# Patient Record
Sex: FEMALE | Race: OTHER | NOT HISPANIC OR LATINO | URBAN - METROPOLITAN AREA
[De-identification: names, ages, dates, MRNs, and addresses within clinical notes are randomized per-mention and may not be internally consistent; named-entity substitution may affect disease eponyms.]

---

## 2017-12-27 ENCOUNTER — HISTORICAL (OUTPATIENT)
Dept: RADIOLOGY | Facility: HOSPITAL | Age: 57
End: 2017-12-27

## 2018-05-14 ENCOUNTER — HISTORICAL (OUTPATIENT)
Dept: ADMINISTRATIVE | Facility: HOSPITAL | Age: 58
End: 2018-05-14

## 2018-05-14 LAB
ALBUMIN SERPL-MCNC: 4.4 GM/DL (ref 3.4–5)
ALBUMIN/GLOB SERPL: 1.76 {RATIO} (ref 1.5–2.5)
ALP SERPL-CCNC: 69 UNIT/L (ref 38–126)
ALT SERPL-CCNC: 18 UNIT/L (ref 7–52)
AST SERPL-CCNC: 19 UNIT/L (ref 15–37)
BILIRUB SERPL-MCNC: 0.6 MG/DL (ref 0.2–1)
BILIRUBIN DIRECT+TOT PNL SERPL-MCNC: 0.1 MG/DL (ref 0–0.5)
BILIRUBIN DIRECT+TOT PNL SERPL-MCNC: 0.5 MG/DL
BUN SERPL-MCNC: 9 MG/DL (ref 7–18)
CALCIUM SERPL-MCNC: 9.6 MG/DL (ref 8.5–10)
CHLORIDE SERPL-SCNC: 103 MMOL/L (ref 98–107)
CHOLEST SERPL-MCNC: 142 MG/DL (ref 0–200)
CHOLEST/HDLC SERPL: 4.3 {RATIO}
CO2 SERPL-SCNC: 30 MMOL/L (ref 21–32)
CREAT SERPL-MCNC: 0.71 MG/DL (ref 0.6–1.3)
GLOBULIN SER-MCNC: 2.5 GM/DL (ref 1.2–3)
GLUCOSE SERPL-MCNC: 83 MG/DL (ref 74–106)
HDLC SERPL-MCNC: 33 MG/DL (ref 35–60)
LDLC SERPL CALC-MCNC: 89 MG/DL (ref 0–129)
POTASSIUM SERPL-SCNC: 4.3 MMOL/L (ref 3.5–5.1)
PROT SERPL-MCNC: 6.9 GM/DL (ref 6.4–8.2)
SODIUM SERPL-SCNC: 138 MMOL/L (ref 136–145)
TRIGL SERPL-MCNC: 83 MG/DL (ref 30–150)
VLDLC SERPL CALC-MCNC: 16.6 MG/DL

## 2018-11-15 ENCOUNTER — HISTORICAL (OUTPATIENT)
Dept: ADMINISTRATIVE | Facility: HOSPITAL | Age: 58
End: 2018-11-15

## 2018-11-15 LAB
ABS NEUT (OLG): 4.5 X10(3)/MCL (ref 2.1–9.2)
ALBUMIN SERPL-MCNC: 4.3 GM/DL (ref 3.4–5)
ALBUMIN/GLOB SERPL: 1.43 {RATIO} (ref 1.5–2.5)
ALP SERPL-CCNC: 64 UNIT/L (ref 38–126)
ALT SERPL-CCNC: 15 UNIT/L (ref 7–52)
AST SERPL-CCNC: 15 UNIT/L (ref 15–37)
BILIRUB SERPL-MCNC: 0.6 MG/DL (ref 0.2–1)
BILIRUBIN DIRECT+TOT PNL SERPL-MCNC: 0.2 MG/DL (ref 0–0.5)
BILIRUBIN DIRECT+TOT PNL SERPL-MCNC: 0.4 MG/DL
BUN SERPL-MCNC: 15 MG/DL (ref 7–18)
CALCIUM SERPL-MCNC: 9.4 MG/DL (ref 8.5–10)
CHLORIDE SERPL-SCNC: 101 MMOL/L (ref 98–107)
CHOLEST SERPL-MCNC: 145 MG/DL (ref 0–200)
CHOLEST/HDLC SERPL: 3.5 {RATIO}
CO2 SERPL-SCNC: 30 MMOL/L (ref 21–32)
CREAT SERPL-MCNC: 0.78 MG/DL (ref 0.6–1.3)
ERYTHROCYTE [DISTWIDTH] IN BLOOD BY AUTOMATED COUNT: 13.5 % (ref 11.5–17)
GLOBULIN SER-MCNC: 3 GM/DL (ref 1.2–3)
GLUCOSE SERPL-MCNC: 100 MG/DL (ref 74–106)
HCT VFR BLD AUTO: 41.9 % (ref 37–47)
HDLC SERPL-MCNC: 41 MG/DL (ref 35–60)
HGB BLD-MCNC: 13.2 GM/DL (ref 12–16)
LDLC SERPL CALC-MCNC: 95 MG/DL (ref 0–129)
LYMPHOCYTES # BLD AUTO: 2.2 X10(3)/MCL (ref 0.6–3.4)
LYMPHOCYTES NFR BLD AUTO: 29.1 % (ref 13–40)
MCH RBC QN AUTO: 28.3 PG (ref 27–31.2)
MCHC RBC AUTO-ENTMCNC: 32 GM/DL (ref 32–36)
MCV RBC AUTO: 90 FL (ref 80–94)
MONOCYTES # BLD AUTO: 0.7 X10(3)/MCL (ref 0.1–1.3)
MONOCYTES NFR BLD AUTO: 9.2 % (ref 0.1–24)
NEUTROPHILS NFR BLD AUTO: 61.7 % (ref 47–80)
PLATELET # BLD AUTO: 301 X10(3)/MCL (ref 130–400)
PMV BLD AUTO: 9.3 FL (ref 9.4–12.4)
POTASSIUM SERPL-SCNC: 4.4 MMOL/L (ref 3.5–5.1)
PROT SERPL-MCNC: 7.3 GM/DL (ref 6.4–8.2)
RBC # BLD AUTO: 4.67 X10(6)/MCL (ref 4.2–5.4)
SODIUM SERPL-SCNC: 135 MMOL/L (ref 136–145)
TRIGL SERPL-MCNC: 72 MG/DL (ref 30–150)
TSH SERPL-ACNC: 2.35 MIU/ML (ref 0.35–4.94)
VLDLC SERPL CALC-MCNC: 14.4 MG/DL
WBC # SPEC AUTO: 7.4 X10(3)/MCL (ref 4.5–11.5)

## 2018-12-28 ENCOUNTER — HISTORICAL (OUTPATIENT)
Dept: RADIOLOGY | Facility: HOSPITAL | Age: 58
End: 2018-12-28

## 2019-05-15 ENCOUNTER — HISTORICAL (OUTPATIENT)
Dept: ADMINISTRATIVE | Facility: HOSPITAL | Age: 59
End: 2019-05-15

## 2019-05-15 LAB
ALBUMIN SERPL-MCNC: 4.2 GM/DL (ref 3.4–5)
ALBUMIN/GLOB SERPL: 1.5 {RATIO} (ref 1.5–2.5)
ALP SERPL-CCNC: 69 UNIT/L (ref 38–126)
ALT SERPL-CCNC: 20 UNIT/L (ref 7–52)
AST SERPL-CCNC: 17 UNIT/L (ref 15–37)
BILIRUB SERPL-MCNC: 0.5 MG/DL (ref 0.2–1)
BILIRUBIN DIRECT+TOT PNL SERPL-MCNC: 0.1 MG/DL (ref 0–0.5)
BILIRUBIN DIRECT+TOT PNL SERPL-MCNC: 0.4 MG/DL
BUN SERPL-MCNC: 14 MG/DL (ref 7–18)
CALCIUM SERPL-MCNC: 9.2 MG/DL (ref 8.5–10)
CHLORIDE SERPL-SCNC: 105 MMOL/L (ref 98–107)
CHOLEST SERPL-MCNC: 149 MG/DL (ref 0–200)
CHOLEST/HDLC SERPL: 3.7 {RATIO}
CO2 SERPL-SCNC: 28 MMOL/L (ref 21–32)
CREAT SERPL-MCNC: 0.64 MG/DL (ref 0.6–1.3)
GLOBULIN SER-MCNC: 2.8 GM/DL (ref 1.2–3)
GLUCOSE SERPL-MCNC: 92 MG/DL (ref 74–106)
HDLC SERPL-MCNC: 40 MG/DL (ref 35–60)
LDLC SERPL CALC-MCNC: 103 MG/DL (ref 0–129)
POTASSIUM SERPL-SCNC: 4.4 MMOL/L (ref 3.5–5.1)
PROT SERPL-MCNC: 7 GM/DL (ref 6.4–8.2)
SODIUM SERPL-SCNC: 136 MMOL/L (ref 136–145)
TRIGL SERPL-MCNC: 51 MG/DL (ref 30–150)
VLDLC SERPL CALC-MCNC: 10.2 MG/DL

## 2019-11-19 ENCOUNTER — HISTORICAL (OUTPATIENT)
Dept: ADMINISTRATIVE | Facility: HOSPITAL | Age: 59
End: 2019-11-19

## 2019-11-19 LAB
ABS NEUT (OLG): 4.7 X10(3)/MCL (ref 2.1–9.2)
ALBUMIN SERPL-MCNC: 4.4 GM/DL (ref 3.4–5)
ALBUMIN/GLOB SERPL: 1.57 {RATIO} (ref 1.5–2.5)
ALP SERPL-CCNC: 61 UNIT/L (ref 38–126)
ALT SERPL-CCNC: 17 UNIT/L (ref 7–52)
APPEARANCE, UA: CLEAR
AST SERPL-CCNC: 16 UNIT/L (ref 15–37)
BACTERIA #/AREA URNS AUTO: ABNORMAL /HPF
BILIRUB SERPL-MCNC: 0.7 MG/DL (ref 0.2–1)
BILIRUB UR QL STRIP: NEGATIVE MG/DL
BILIRUBIN DIRECT+TOT PNL SERPL-MCNC: 0.2 MG/DL (ref 0–0.5)
BILIRUBIN DIRECT+TOT PNL SERPL-MCNC: 0.5 MG/DL
BUN SERPL-MCNC: 13 MG/DL (ref 7–18)
CALCIUM SERPL-MCNC: 9.7 MG/DL (ref 8.5–10)
CHLORIDE SERPL-SCNC: 102 MMOL/L (ref 98–107)
CHOLEST SERPL-MCNC: 157 MG/DL (ref 0–200)
CHOLEST/HDLC SERPL: 4.2 {RATIO}
CO2 SERPL-SCNC: 30 MMOL/L (ref 21–32)
COLOR UR: YELLOW
CREAT SERPL-MCNC: 0.77 MG/DL (ref 0.6–1.3)
ERYTHROCYTE [DISTWIDTH] IN BLOOD BY AUTOMATED COUNT: 13.4 % (ref 11.5–17)
GLOBULIN SER-MCNC: 2.8 GM/DL (ref 1.2–3)
GLUCOSE (UA): NEGATIVE MG/DL
GLUCOSE SERPL-MCNC: 101 MG/DL (ref 74–106)
HCT VFR BLD AUTO: 39.8 % (ref 37–47)
HDLC SERPL-MCNC: 37 MG/DL (ref 35–60)
HGB BLD-MCNC: 13.1 GM/DL (ref 12–16)
HGB UR QL STRIP: ABNORMAL UNIT/L
KETONES UR QL STRIP: NEGATIVE MG/DL
LDLC SERPL CALC-MCNC: 109 MG/DL (ref 0–129)
LEUKOCYTE ESTERASE UR QL STRIP: NEGATIVE UNIT/L
LYMPHOCYTES # BLD AUTO: 2.5 X10(3)/MCL (ref 0.6–3.4)
LYMPHOCYTES NFR BLD AUTO: 32.6 % (ref 13–40)
MCH RBC QN AUTO: 27.6 PG (ref 27–31.2)
MCHC RBC AUTO-ENTMCNC: 33 GM/DL (ref 32–36)
MCV RBC AUTO: 84 FL (ref 80–94)
MONOCYTES # BLD AUTO: 0.5 X10(3)/MCL (ref 0.1–1.3)
MONOCYTES NFR BLD AUTO: 6.8 % (ref 0.1–24)
NEUTROPHILS NFR BLD AUTO: 60.6 % (ref 47–80)
NITRITE UR QL STRIP.AUTO: NEGATIVE
PH UR STRIP: 5 [PH]
PLATELET # BLD AUTO: 301 X10(3)/MCL (ref 130–400)
PMV BLD AUTO: 9.4 FL (ref 9.4–12.4)
POTASSIUM SERPL-SCNC: 4.1 MMOL/L (ref 3.5–5.1)
PROT SERPL-MCNC: 7.2 GM/DL (ref 6.4–8.2)
PROT UR QL STRIP: NEGATIVE MG/DL
RBC # BLD AUTO: 4.74 X10(6)/MCL (ref 4.2–5.4)
RBC #/AREA URNS HPF: ABNORMAL /HPF
SODIUM SERPL-SCNC: 137 MMOL/L (ref 136–145)
SP GR UR STRIP: 1.02
SQUAMOUS EPITHELIAL, UA: ABNORMAL /LPF
TRIGL SERPL-MCNC: 80 MG/DL (ref 30–150)
TSH SERPL-ACNC: 0.79 MIU/ML (ref 0.35–4.94)
UROBILINOGEN UR STRIP-ACNC: 0.2 MG/DL
VLDLC SERPL CALC-MCNC: 16 MG/DL
WBC # SPEC AUTO: 7.7 X10(3)/MCL (ref 4.5–11.5)
WBC #/AREA URNS AUTO: ABNORMAL /[HPF]

## 2020-08-20 ENCOUNTER — HISTORICAL (OUTPATIENT)
Dept: RADIOLOGY | Facility: HOSPITAL | Age: 60
End: 2020-08-20

## 2020-12-15 ENCOUNTER — HISTORICAL (OUTPATIENT)
Dept: ADMINISTRATIVE | Facility: HOSPITAL | Age: 60
End: 2020-12-15

## 2020-12-15 LAB
ABS NEUT (OLG): 5.2 X10(3)/MCL (ref 2.1–9.2)
ALBUMIN SERPL-MCNC: 4.5 GM/DL (ref 3.4–5)
ALBUMIN/GLOB SERPL: 1.67 {RATIO} (ref 1.5–2.5)
ALP SERPL-CCNC: 60 UNIT/L (ref 38–126)
ALT SERPL-CCNC: 21 UNIT/L (ref 7–52)
AST SERPL-CCNC: 20 UNIT/L (ref 15–37)
BILIRUB SERPL-MCNC: 0.6 MG/DL (ref 0.2–1)
BILIRUBIN DIRECT+TOT PNL SERPL-MCNC: 0.1 MG/DL (ref 0–0.5)
BILIRUBIN DIRECT+TOT PNL SERPL-MCNC: 0.5 MG/DL
BUN SERPL-MCNC: 13 MG/DL (ref 7–18)
CALCIUM SERPL-MCNC: 9.9 MG/DL (ref 8.5–10)
CHLORIDE SERPL-SCNC: 101 MMOL/L (ref 98–107)
CHOLEST SERPL-MCNC: 149 MG/DL (ref 0–200)
CHOLEST/HDLC SERPL: 3.7 {RATIO}
CO2 SERPL-SCNC: 31 MMOL/L (ref 21–32)
CREAT SERPL-MCNC: 0.81 MG/DL (ref 0.6–1.3)
ERYTHROCYTE [DISTWIDTH] IN BLOOD BY AUTOMATED COUNT: 13.4 % (ref 11.5–17)
GLOBULIN SER-MCNC: 2.7 GM/DL (ref 1.2–3)
GLUCOSE SERPL-MCNC: 82 MG/DL (ref 74–106)
HCT VFR BLD AUTO: 40.6 % (ref 37–47)
HDLC SERPL-MCNC: 40 MG/DL (ref 35–60)
HGB BLD-MCNC: 13.1 GM/DL (ref 12–16)
LDLC SERPL CALC-MCNC: 101 MG/DL (ref 0–129)
LYMPHOCYTES # BLD AUTO: 2.7 X10(3)/MCL (ref 0.6–3.4)
LYMPHOCYTES NFR BLD AUTO: 31.4 % (ref 13–40)
MCH RBC QN AUTO: 27.3 PG (ref 27–31.2)
MCHC RBC AUTO-ENTMCNC: 32 GM/DL (ref 32–36)
MCV RBC AUTO: 85 FL (ref 80–94)
MONOCYTES # BLD AUTO: 0.8 X10(3)/MCL (ref 0.1–1.3)
MONOCYTES NFR BLD AUTO: 9.5 % (ref 0.1–24)
NEUTROPHILS NFR BLD AUTO: 59.1 % (ref 47–80)
PLATELET # BLD AUTO: 347 X10(3)/MCL (ref 130–400)
PMV BLD AUTO: 9.3 FL (ref 9.4–12.4)
POTASSIUM SERPL-SCNC: 4 MMOL/L (ref 3.5–5.1)
PROT SERPL-MCNC: 7.2 GM/DL (ref 6.4–8.2)
RBC # BLD AUTO: 4.79 X10(6)/MCL (ref 4.2–5.4)
SODIUM SERPL-SCNC: 138 MMOL/L (ref 136–145)
TRIGL SERPL-MCNC: 125 MG/DL (ref 30–150)
TSH SERPL-ACNC: 2.33 MIU/ML (ref 0.35–4.94)
VLDLC SERPL CALC-MCNC: 25 MG/DL
WBC # SPEC AUTO: 8.7 X10(3)/MCL (ref 4.5–11.5)

## 2021-08-23 ENCOUNTER — HISTORICAL (OUTPATIENT)
Dept: RADIOLOGY | Facility: HOSPITAL | Age: 61
End: 2021-08-23

## 2021-12-16 ENCOUNTER — HISTORICAL (OUTPATIENT)
Dept: ADMINISTRATIVE | Facility: HOSPITAL | Age: 61
End: 2021-12-16

## 2021-12-16 LAB
ABS NEUT (OLG): 4.7 X10(3)/MCL (ref 2.1–9.2)
ALBUMIN SERPL-MCNC: 4.5 GM/DL (ref 3.4–5)
ALBUMIN/GLOB SERPL: 1.61 {RATIO} (ref 1.5–2.5)
ALP SERPL-CCNC: 63 UNIT/L (ref 38–126)
ALT SERPL-CCNC: 20 UNIT/L (ref 7–52)
AST SERPL-CCNC: 19 UNIT/L (ref 15–37)
BILIRUB SERPL-MCNC: 0.6 MG/DL (ref 0.2–1)
BILIRUBIN DIRECT+TOT PNL SERPL-MCNC: 0.1 MG/DL (ref 0–0.5)
BILIRUBIN DIRECT+TOT PNL SERPL-MCNC: 0.5 MG/DL
BUN SERPL-MCNC: 14 MG/DL (ref 7–18)
CALCIUM SERPL-MCNC: 9.8 MG/DL (ref 8.5–10)
CHLORIDE SERPL-SCNC: 102 MMOL/L (ref 98–107)
CHOLEST SERPL-MCNC: 135 MG/DL (ref 0–200)
CHOLEST/HDLC SERPL: 3.5 {RATIO}
CO2 SERPL-SCNC: 28 MMOL/L (ref 21–32)
CREAT SERPL-MCNC: 0.72 MG/DL (ref 0.6–1.3)
ERYTHROCYTE [DISTWIDTH] IN BLOOD BY AUTOMATED COUNT: 13.6 % (ref 11.5–17)
GLOBULIN SER-MCNC: 2.8 GM/DL (ref 1.2–3)
GLUCOSE SERPL-MCNC: 90 MG/DL (ref 74–106)
HCT VFR BLD AUTO: 40.4 % (ref 37–47)
HDLC SERPL-MCNC: 39 MG/DL (ref 35–60)
HGB BLD-MCNC: 12.8 GM/DL (ref 12–16)
LDLC SERPL CALC-MCNC: 83 MG/DL (ref 0–129)
LYMPHOCYTES # BLD AUTO: 2.7 X10(3)/MCL (ref 0.6–3.4)
LYMPHOCYTES NFR BLD AUTO: 33.7 % (ref 13–40)
MCH RBC QN AUTO: 27.1 PG (ref 27–31.2)
MCHC RBC AUTO-ENTMCNC: 32 GM/DL (ref 32–36)
MCV RBC AUTO: 85 FL (ref 80–94)
MONOCYTES # BLD AUTO: 0.7 X10(3)/MCL (ref 0.1–1.3)
MONOCYTES NFR BLD AUTO: 8.5 % (ref 0.1–24)
NEUTROPHILS NFR BLD AUTO: 57.8 % (ref 47–80)
PLATELET # BLD AUTO: 344 X10(3)/MCL (ref 130–400)
PMV BLD AUTO: 10.3 FL (ref 9.4–12.4)
POTASSIUM SERPL-SCNC: 4.3 MMOL/L (ref 3.5–5.1)
PROT SERPL-MCNC: 7.3 GM/DL (ref 6.4–8.2)
RBC # BLD AUTO: 4.73 X10(6)/MCL (ref 4.2–5.4)
SODIUM SERPL-SCNC: 137 MMOL/L (ref 136–145)
TRIGL SERPL-MCNC: 94 MG/DL (ref 30–150)
TSH SERPL-ACNC: 2.35 MIU/ML (ref 0.35–4.94)
VLDLC SERPL CALC-MCNC: 18.8 MG/DL
WBC # SPEC AUTO: 8.1 X10(3)/MCL (ref 4.5–11.5)

## 2022-04-11 ENCOUNTER — HISTORICAL (OUTPATIENT)
Dept: ADMINISTRATIVE | Facility: HOSPITAL | Age: 62
End: 2022-04-11
Payer: COMMERCIAL

## 2022-04-27 VITALS
HEIGHT: 66 IN | DIASTOLIC BLOOD PRESSURE: 104 MMHG | OXYGEN SATURATION: 93 % | WEIGHT: 208.56 LBS | SYSTOLIC BLOOD PRESSURE: 168 MMHG | BODY MASS INDEX: 33.52 KG/M2

## 2022-08-05 PROBLEM — M19.049 OSTEOARTHRITIS OF HAND: Status: ACTIVE | Noted: 2022-08-05

## 2022-08-05 PROBLEM — K57.30 DIVERTICULOSIS OF COLON: Status: ACTIVE | Noted: 2022-08-05

## 2022-08-05 PROBLEM — E78.00 HYPERCHOLESTEROLEMIA: Status: ACTIVE | Noted: 2022-08-05

## 2022-08-05 PROBLEM — E66.9 OBESITY: Status: ACTIVE | Noted: 2022-08-05

## 2022-08-05 PROBLEM — M54.50 LOW BACK PAIN: Status: ACTIVE | Noted: 2022-08-05

## 2022-08-05 PROBLEM — I10 HYPERTENSION: Status: ACTIVE | Noted: 2022-08-05

## 2022-08-25 ENCOUNTER — HOSPITAL ENCOUNTER (OUTPATIENT)
Dept: RADIOLOGY | Facility: HOSPITAL | Age: 62
Discharge: HOME | End: 2022-08-25
Payer: COMMERCIAL

## 2022-08-25 DIAGNOSIS — Z12.31 ENCOUNTER FOR SCREENING MAMMOGRAM FOR BREAST CANCER: ICD-10-CM

## 2022-08-25 PROCEDURE — 77063 BREAST TOMOSYNTHESIS BI: CPT | Mod: 26,,, | Performed by: RADIOLOGY

## 2022-08-25 PROCEDURE — 77067 MAMMO DIGITAL SCREENING BILAT WITH TOMO: ICD-10-PCS | Mod: 26,,, | Performed by: RADIOLOGY

## 2022-08-25 PROCEDURE — 77067 SCR MAMMO BI INCL CAD: CPT | Mod: TC

## 2022-08-25 PROCEDURE — 77063 MAMMO DIGITAL SCREENING BILAT WITH TOMO: ICD-10-PCS | Mod: 26,,, | Performed by: RADIOLOGY

## 2022-08-25 PROCEDURE — 77067 SCR MAMMO BI INCL CAD: CPT | Mod: 26,,, | Performed by: RADIOLOGY

## 2023-08-28 ENCOUNTER — HOSPITAL ENCOUNTER (OUTPATIENT)
Dept: RADIOLOGY | Facility: HOSPITAL | Age: 63
Discharge: HOME | End: 2023-08-28
Attending: STUDENT IN AN ORGANIZED HEALTH CARE EDUCATION/TRAINING PROGRAM
Payer: COMMERCIAL

## 2023-08-28 DIAGNOSIS — Z12.31 ENCOUNTER FOR SCREENING MAMMOGRAM FOR BREAST CANCER: ICD-10-CM

## 2023-08-28 PROCEDURE — 77067 MAMMO DIGITAL SCREENING BILAT WITH TOMO: ICD-10-PCS | Mod: 26,,, | Performed by: STUDENT IN AN ORGANIZED HEALTH CARE EDUCATION/TRAINING PROGRAM

## 2023-08-28 PROCEDURE — 77067 SCR MAMMO BI INCL CAD: CPT | Mod: 26,,, | Performed by: STUDENT IN AN ORGANIZED HEALTH CARE EDUCATION/TRAINING PROGRAM

## 2023-08-28 PROCEDURE — 77063 BREAST TOMOSYNTHESIS BI: CPT | Mod: 26,,, | Performed by: STUDENT IN AN ORGANIZED HEALTH CARE EDUCATION/TRAINING PROGRAM

## 2023-08-28 PROCEDURE — 77067 SCR MAMMO BI INCL CAD: CPT | Mod: TC

## 2023-08-28 PROCEDURE — 77063 MAMMO DIGITAL SCREENING BILAT WITH TOMO: ICD-10-PCS | Mod: 26,,, | Performed by: STUDENT IN AN ORGANIZED HEALTH CARE EDUCATION/TRAINING PROGRAM

## 2024-04-02 ENCOUNTER — OFFICE VISIT (OUTPATIENT)
Dept: URGENT CARE | Facility: CLINIC | Age: 64
End: 2024-04-02
Payer: COMMERCIAL

## 2024-04-02 VITALS
WEIGHT: 235 LBS | BODY MASS INDEX: 39.15 KG/M2 | HEART RATE: 90 BPM | SYSTOLIC BLOOD PRESSURE: 138 MMHG | RESPIRATION RATE: 18 BRPM | TEMPERATURE: 98 F | DIASTOLIC BLOOD PRESSURE: 78 MMHG | HEIGHT: 65 IN | OXYGEN SATURATION: 99 %

## 2024-04-02 DIAGNOSIS — M79.10 MUSCLE SORENESS: Primary | ICD-10-CM

## 2024-04-02 PROCEDURE — 99213 OFFICE O/P EST LOW 20 MIN: CPT | Mod: ,,, | Performed by: NURSE PRACTITIONER

## 2024-04-02 RX ORDER — CYCLOBENZAPRINE HCL 10 MG
10 TABLET ORAL 3 TIMES DAILY PRN
Qty: 21 TABLET | Refills: 0 | Status: SHIPPED | OUTPATIENT
Start: 2024-04-02 | End: 2024-04-09
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
## 2024-04-02 NOTE — PATIENT INSTRUCTIONS
Medications sent to pharmacy use this as needed for any muscle soreness caution with this medication can cause some sedation effects   Use 600 mg ibuprofen pretty pain or discomfort every 6 hours   Apply warm compresses to the muscle areas of most sore along with monitoring for any new onset of headaches or dizziness or any other neurological changes.  If these do occur please seek medical treatment immediately in the nearest emergency room.

## 2024-06-04 DIAGNOSIS — Z12.31 OTHER SCREENING MAMMOGRAM: Primary | ICD-10-CM

## 2024-08-19 ENCOUNTER — OFFICE VISIT (OUTPATIENT)
Dept: URGENT CARE | Facility: CLINIC | Age: 64
End: 2024-08-19
Payer: COMMERCIAL

## 2024-08-19 VITALS
DIASTOLIC BLOOD PRESSURE: 68 MMHG | BODY MASS INDEX: 38.32 KG/M2 | OXYGEN SATURATION: 99 % | SYSTOLIC BLOOD PRESSURE: 130 MMHG | TEMPERATURE: 99 F | HEIGHT: 65 IN | RESPIRATION RATE: 16 BRPM | WEIGHT: 230 LBS | HEART RATE: 86 BPM

## 2024-08-19 DIAGNOSIS — J01.40 ACUTE NON-RECURRENT PANSINUSITIS: Primary | ICD-10-CM

## 2024-08-19 DIAGNOSIS — R09.81 SINUS CONGESTION: ICD-10-CM

## 2024-08-19 DIAGNOSIS — U07.1 COVID-19: ICD-10-CM

## 2024-08-19 DIAGNOSIS — U07.1 COVID-19 VIRUS DETECTED: ICD-10-CM

## 2024-08-19 LAB
CTP QC/QA: YES
SARS-COV-2 AG RESP QL IA.RAPID: POSITIVE

## 2024-08-19 PROCEDURE — 96372 THER/PROPH/DIAG INJ SC/IM: CPT | Mod: ,,, | Performed by: FAMILY MEDICINE

## 2024-08-19 PROCEDURE — 87811 SARS-COV-2 COVID19 W/OPTIC: CPT | Mod: QW,,, | Performed by: FAMILY MEDICINE

## 2024-08-19 PROCEDURE — 99213 OFFICE O/P EST LOW 20 MIN: CPT | Mod: 25,,, | Performed by: FAMILY MEDICINE

## 2024-08-19 RX ORDER — AZITHROMYCIN 250 MG/1
TABLET, FILM COATED ORAL
Qty: 6 TABLET | Refills: 0 | Status: SHIPPED | OUTPATIENT
Start: 2024-08-19 | End: 2024-08-24

## 2024-08-19 RX ORDER — BETAMETHASONE SODIUM PHOSPHATE AND BETAMETHASONE ACETATE 3; 3 MG/ML; MG/ML
12 INJECTION, SUSPENSION INTRA-ARTICULAR; INTRALESIONAL; INTRAMUSCULAR; SOFT TISSUE
Status: COMPLETED | OUTPATIENT
Start: 2024-08-19 | End: 2024-08-19

## 2024-08-19 RX ADMIN — BETAMETHASONE SODIUM PHOSPHATE AND BETAMETHASONE ACETATE 12 MG: 3; 3 INJECTION, SUSPENSION INTRA-ARTICULAR; INTRALESIONAL; INTRAMUSCULAR; SOFT TISSUE at 03:08

## 2024-08-19 NOTE — PATIENT INSTRUCTIONS
COVID-19 Discharge Instructions   About this topic   Coronavirus disease 2019 is also known as COVID-19. It is a viral illness that infects the lungs. It is caused by a virus called SARS-associated coronavirus (SARS-CoV-2).  The signs of COVID-19 most often start a few days after you have been infected. In some people, it takes longer to show signs. Others never show signs of the infection. You may have a cough, fever, shaking chills and it may be hard to breathe. You may be very tired, have muscle aches, a headache or sore throat. Some people have an upset stomach or loose stools. Others lose their sense of smell or taste. You may not have these signs all the time and they may come and go while you are sick.  The virus spreads easily through droplets when you talk, sneeze, or cough. You can pass the virus to others when you are talking close together, singing, hugging, sharing food, or shaking hands. Doctors believe the germs also survive on surfaces like tables, door handles, and telephones. However, this is not a common way that COVID-19 spreads. Doctors believe you can also spread the infection even if you dont have any symptoms, but they do not know how that happens. This is why getting vaccinated is one of the best ways to keep you healthy and slow the spread of the virus.  Some people have a mild case of COVID-19 and are able to stay at home and away from others until they feel better. Others may need to be in the hospital if they are very sick. Some people with COVID-19 can have some symptoms for weeks or months. People with COVID-19 must isolate themselves. You can start to be around others when your doctor says it is safe to do so.       What care is needed at home?   Ask your doctor what you need to do when you go home. Make sure you ask questions if you do not understand what the doctor says.  Drink lots of water, juice, or broth to replace fluids lost from a fever.  You may use cool mist humidifiers to  help ease congestion and coughing.  Use 2 to 3 pillows to prop yourself up when you lie down to make it easier to breathe and sleep.  Do not smoke and do not drink beer, wine, and mixed drinks (alcohol).  To lower the chance of passing the infection to others, get a COVID-19 vaccine after your infection has resolved.  If you have not been fully vaccinated:  Wear a mask over your mouth and nose if you are around others who are not sick. Cloth masks work best if they have more than one layer of fabric.  Wash your hands often.  Stay home in a separate room, if possible, away from others. Only go out to get medical care.  Use a separate bathroom if possible.  Do not make food for others.  What follow-up care is needed?   Your doctor may ask you to make visits to the office to check on your progress. Be sure to keep these visits. Make sure you wear a mask at these visits.  If you can, tell the staff you have COVID-19 ahead of time so they can take extra care to stop the disease from spreading.  It may take a few weeks before your health returns to normal.  What drugs may be needed?   The doctor may order drugs to:  Help with breathing  Help with fever  Help with swelling in your airways and lungs  Control coughing  Ease a sore throat  Help a runny or stuffy nose  Will physical activity be limited?   You may have to limit your physical activity. Talk to your doctor about the right amount of activity for you. If you have been very sick with COVID-19, it can take some time to get your strength back.  Will there be any other care needed?   Doctors do not know how long you can pass the virus on to others after you are sick. This is why it is important to stay in a separate room, if possible, when you are sick. For now, doctors are giving general guidelines for you to follow after you have been sick. Before you go around other people, you should:  Be fever free for 24 hours without taking any drugs to lower the fever  Have no  symptoms of cough or shortness of breath  Wait at least 10 days after first having symptoms or your first positive test, and you need to be symptom free as above. Some experts suggest waiting 20 days if you have had a more severe infection.  Talk with your doctor about getting a COVID-19 vaccine.  What problems could happen?   Fluid loss. This is dehydration.  Short-term or long-term lung damage  Heart problems  Death  When do I need to call the doctor?   You are having so much trouble breathing that you can only say one or two words at a time.  You need to sit upright at all times to be able to breathe and/or cannot lie down.  You are very confused or cannot stay awake.  Your lips or skin start to turn blue or grey.  You think you might be having a medical emergency. Some examples of medical emergencies are:  Severe chest pain.  Not able to speak or move normally.  You have trouble breathing when talking or sitting still.  You have new shortness of breath.  You become weak or dizzy.  You have very dark urine or do not pass urine for more than 8 hours.  You have new or worsening COVID-19 symptoms like:  Fever  Cough  Feeling very tired  Shaking chills  Headache  Trouble swallowing  Throwing up  Loose stools  Reddish purple spots on your fingers or toes  Teach Back: Helping You Understand   The Teach Back Method helps you understand the information we are giving you. After you talk with the staff, tell them in your own words what you learned. This helps to make sure the staff has described each thing clearly. It also helps to explain things that may have been confusing. Before going home, make sure you can do these:  I can tell you about my condition.  I can tell you what may help ease my breathing.  I can tell you what I can do to help avoid passing the infection to others.  I can tell you what I will do if I have trouble breathing; feel sleepy or confused; or my fingertips, fingernails, skin, or lips are  blue.  Where can I learn more?   Centers for Disease Control and Prevention  https://www.cdc.gov/coronavirus/2019-ncov/about/index.html   Centers for Disease Control and Prevention  https://www.cdc.gov/coronavirus/2019-ncov/hcp/disposition-in-home-patients.html   World Health Organization  https://www.who.int/news-room/q-a-detail/w-l-hzvnrxaedjzjq   Last Reviewed Date   2021-10-05  Consumer Information Use and Disclaimer   This information is not specific medical advice and does not replace information you receive from your health care provider. This is only a brief summary of general information. It does NOT include all information about conditions, illnesses, injuries, tests, procedures, treatments, therapies, discharge instructions or life-style choices that may apply to you. You must talk with your health care provider for complete information about your health and treatment options. This information should not be used to decide whether or not to accept your health care providers advice, instructions or recommendations. Only your health care provider has the knowledge and training to provide advice that is right for you.  Copyright   Copyright © 2021 [employer] and its affiliates and/or licensors. All rights reserved.

## 2024-08-19 NOTE — PROGRESS NOTES
Patient ID: Daysi Durham is a 63 y.o. female.  Chief Complaint: Sinus Problem    HPI:   Patient presents here today for above reason.       Patient is a 63 y.o. female who presents to urgent care with complaints of sinus congestion, pressure in ears, runny nose, sore throat, cough, fatigue, bilat eyes are watery x 2-3 days. Alleviating factors include Coricidin with moderate amount of relief. Patient denies fever, chest pain, SOB, or any other symptoms.           Past Medical History:  Past Medical History:   Diagnosis Date    Diverticulitis of colon     HTN (hypertension)     Hypercholesteremia     Internal hemorrhoids     Lower back pain     Obesity, unspecified     Osteoarthritis, hand      Past Surgical History:   Procedure Laterality Date     1982      colonoscopy 2019      left breast biopsy  10/08/2014     Review of patient's allergies indicates:  No Known Allergies  Current Outpatient Medications on File Prior to Visit   Medication Sig Dispense Refill    aspirin 81 mg Cap Take 81 mg by mouth Daily.      celecoxib (CELEBREX) 200 MG capsule Take 1 capsule (200 mg total) by mouth 2 (two) times daily. 60 capsule 5    clobetasoL (TEMOVATE) 0.05 % cream SMARTSI sparingly Topical Daily      lisinopriL-hydrochlorothiazide (PRINZIDE,ZESTORETIC) 10-12.5 mg per tablet TAKE 1 TABLET BY MOUTH EVERY DAY 90 tablet 3    omega-3 fatty acids fish oil (OMEGA-3 2100) 1,050-1,200 mg Cap capsule Take 1,200 mg by mouth.      rosuvastatin (CRESTOR) 20 MG tablet TAKE 1 TABLET BY MOUTH DAILY 90 tablet 3    urea 20 % Crea APPLY 1 GRAM TO AFFECTED AREA ONCE A DAY      [DISCONTINUED] methylPREDNISolone (MEDROL DOSEPACK) 4 mg tablet follow package directions (Patient not taking: Reported on 2024) 21 tablet 0     No current facility-administered medications on file prior to visit.     Social History     Socioeconomic History    Marital status:      Spouse name: Jose    Number of children: 4  "  Occupational History    Occupation: Business Owner   Tobacco Use    Smoking status: Never    Smokeless tobacco: Never   Substance and Sexual Activity    Alcohol use: Not Currently    Drug use: Never    Sexual activity: Yes     Partners: Male     Family History   Problem Relation Name Age of Onset    Coronary artery disease Mother      COPD Father      Pulmonary embolism Brother      Asthma Brother      Brain cancer Brother      No Known Problems Son      No Known Problems Daughter      Cerebral palsy Daughter      Autism Daughter       ROS:   Review of Systems   Constitutional:  Positive for chills and fatigue. Negative for fever.   HENT:  Positive for congestion, sinus pressure, sinus pain and sore throat.    Respiratory:  Positive for cough. Negative for wheezing.    Gastrointestinal: Negative.    All other systems reviewed and are negative.      Vitals/PE:   /68   Pulse 86   Temp 98.6 °F (37 °C)   Resp 16   Ht 5' 5" (1.651 m)   Wt 104.3 kg (230 lb)   SpO2 99%   BMI 38.27 kg/m²   Physical Exam  Vitals and nursing note reviewed.   Constitutional:       Appearance: She is ill-appearing. She is not toxic-appearing or diaphoretic.   HENT:      Right Ear: Tympanic membrane normal.      Left Ear: Tympanic membrane normal.      Nose: Mucosal edema and congestion present.      Right Turbinates: Enlarged and swollen.      Left Turbinates: Enlarged and swollen.      Right Sinus: Maxillary sinus tenderness and frontal sinus tenderness present.      Left Sinus: Maxillary sinus tenderness and frontal sinus tenderness present.      Mouth/Throat:      Pharynx: Posterior oropharyngeal erythema present.   Eyes:      Pupils: Pupils are equal, round, and reactive to light.   Cardiovascular:      Rate and Rhythm: Normal rate.      Pulses: Normal pulses.   Pulmonary:      Effort: Pulmonary effort is normal.   Skin:     General: Skin is warm.      Capillary Refill: Capillary refill takes less than 2 seconds. "   Neurological:      General: No focal deficit present.      Mental Status: She is alert and oriented to person, place, and time.   Psychiatric:         Mood and Affect: Mood normal.         Assessment/Plan:   Acute non-recurrent pansinusitis  -     betamethasone acetate-betamethasone sodium phosphate injection 12 mg  -     azithromycin (Z-JOLEEN) 250 MG tablet; Take 2 tablets by mouth on day 1; Take 1 tablet by mouth on days 2-5  Dispense: 6 tablet; Refill: 0  Treatment as above and below.   COVID-19  COVID Positive  Start vitamin C 1000mg twice a day, zinc 100mg once a day, and vitamin D3 at least 2000 units a day. Current CDC guidelines recommend that you quarantine for 3 days starting the day after your symptoms began. Quarantine can end after 3 days as long as the last 24 hours of quarantine you are fever free and there is improvement of all your symptoms. Wear a mask around others for 3 additional days after quarantine. Treat your symptoms as you would the common cold. If you live with anybody, isolate yourself in a separate bedroom and use a separate bathroom. If your symptoms worsen or you develop shortness of breath or a fever over 102.5 , seek medical attention immediately.    Sinus congestion  -     SARS Coronavirus 2 Antigen, POCT Manual Read  Covid +. See note above.      Orders Placed This Encounter   Procedures    SARS Coronavirus 2 Antigen, POCT Manual Read       Education and counseling done face to face regarding medical conditions and plan. Contact office if new symptoms develop. Should any symptoms ever significantly worsen seek emergency medical attention/go to ER. Follow up at least yearly for wellness or sooner PRN. Nurse to call patient with any results. The patient is receptive, expresses understanding and is agreeable to plan. All questions have been answered.  Follow up if symptoms worsen or fail to improve.

## 2024-08-30 ENCOUNTER — HOSPITAL ENCOUNTER (OUTPATIENT)
Dept: RADIOLOGY | Facility: HOSPITAL | Age: 64
Discharge: HOME | End: 2024-08-30
Attending: STUDENT IN AN ORGANIZED HEALTH CARE EDUCATION/TRAINING PROGRAM

## 2024-08-30 DIAGNOSIS — Z12.31 OTHER SCREENING MAMMOGRAM: ICD-10-CM

## 2024-08-30 PROCEDURE — 77067 SCR MAMMO BI INCL CAD: CPT | Mod: 26,,, | Performed by: STUDENT IN AN ORGANIZED HEALTH CARE EDUCATION/TRAINING PROGRAM

## 2024-08-30 PROCEDURE — 77067 SCR MAMMO BI INCL CAD: CPT | Mod: TC

## 2024-08-30 PROCEDURE — 77063 BREAST TOMOSYNTHESIS BI: CPT | Mod: 26,,, | Performed by: STUDENT IN AN ORGANIZED HEALTH CARE EDUCATION/TRAINING PROGRAM

## 2025-09-04 ENCOUNTER — HOSPITAL ENCOUNTER (OUTPATIENT)
Dept: RADIOLOGY | Facility: HOSPITAL | Age: 65
Discharge: HOME | End: 2025-09-04
Attending: STUDENT IN AN ORGANIZED HEALTH CARE EDUCATION/TRAINING PROGRAM
Payer: COMMERCIAL

## 2025-09-04 DIAGNOSIS — Z12.31 ENCOUNTER FOR SCREENING MAMMOGRAM FOR BREAST CANCER: ICD-10-CM

## 2025-09-04 PROCEDURE — 77067 SCR MAMMO BI INCL CAD: CPT | Mod: 26,,, | Performed by: RADIOLOGY

## 2025-09-04 PROCEDURE — 77063 BREAST TOMOSYNTHESIS BI: CPT | Mod: TC

## 2025-09-04 PROCEDURE — 77063 BREAST TOMOSYNTHESIS BI: CPT | Mod: 26,,, | Performed by: RADIOLOGY
